# Patient Record
Sex: MALE | Race: WHITE | NOT HISPANIC OR LATINO | Employment: FULL TIME | URBAN - METROPOLITAN AREA
[De-identification: names, ages, dates, MRNs, and addresses within clinical notes are randomized per-mention and may not be internally consistent; named-entity substitution may affect disease eponyms.]

---

## 2024-09-19 ENCOUNTER — TELEPHONE (OUTPATIENT)
Dept: FAMILY MEDICINE CLINIC | Facility: CLINIC | Age: 34
End: 2024-09-19

## 2024-09-19 ENCOUNTER — NURSE TRIAGE (OUTPATIENT)
Age: 34
End: 2024-09-19

## 2024-09-19 NOTE — TELEPHONE ENCOUNTER
"Patient called in stating he is feeling of jittery today and would like to establish care. Patient diagnosed with Anxiety in the past. He states he had 2 \"seizures\" in past but, currently fells okay. He is at work- [employer].  He is not SI. He took 1 mg (alprazolam?) from some of his older medication yesterday and felt better. Did not sleep last night, having flash backs. He is a [place]. He states he has a panic disorder. His brother committed suicide 4 months ago. He has been trying to get in to see Mental health practitioner but, cannot get appointment until December. He has been to ED multiple times but, refuses to go for care today. I have a very poor connection patient. He has Medicaid insurance [id number]. Provision # [id number]. Group # [id number]. Patient will bring insurance information with him to OV 2pm. He will also attempt to get his records sent prior to OV to establish care today(other staff assisted with call). Not able to fully register due to poor phone connection and patient needing to get back to work.   Answer Assessment - Initial Assessment Questions  1. CONCERN: \"What happened that made you call today?\"      I am jittery and I want to establish care  2. DEPRESSION SYMPTOM SCREENING: \"How are you feeling overall?\" (e.g., decreased energy, increased sleeping or difficulty sleeping, difficulty concentrating, feelings of sadness, guilt, hopelessness, or worthlessness)      Jittery, frozen, anxiety, scared   3. RISK OF HARM - SUICIDAL IDEATION:  \"Do you ever have thoughts of hurting or killing yourself?\"  (e.g., yes, no, no but preoccupation with thoughts about death)    - INTENT:  \"Do you have thoughts of hurting or killing yourself right NOW?\" (e.g., yes, no, N/A)    - PLAN: \"Do you have a specific plan for how you would do this?\" (e.g., gun, knife, overdose, no plan, N/A)      no  4. RISK OF HARM - HOMICIDAL IDEATION:  \"Do you ever have thoughts of hurting or killing someone else?\"  (e.g., " "yes, no, no but preoccupation with thoughts about death)    - INTENT:  \"Do you have thoughts of hurting or killing someone right NOW?\" (e.g., yes, no, N/A)    - PLAN: \"Do you have a specific plan for how you would do this?\" (e.g., gun, knife, no plan, N/A)       no  5. FUNCTIONAL IMPAIRMENT: \"How have things been going for you overall? Have you had more difficulty than usual doing your normal daily activities?\"  (e.g., better, same, worse; self-care, school, work, interactions)      I'm at work, my mind is scattered  6. SUPPORT: \"Who is with you now?\" \"Who do you live with?\" \"Do you have family or friends who you can talk to?\"       I'm at work  7. THERAPIST: \"Do you have a counselor or therapist? Name?\"      No waiting to see someone   8. STRESSORS: \"Has there been any new stress or recent changes in your life?\"      Lost job in July. Saw his bother commit suicide   9. ALCOHOL USE OR SUBSTANCE USE (DRUG USE): \"Do you drink alcohol or use any illegal drugs?\"      no  10. OTHER: \"Do you have any other physical symptoms right now?\" (e.g., fever)        Feels jittery    Protocols used: Depression-ADULT-[place]    "

## 2024-09-19 NOTE — TELEPHONE ENCOUNTER
Per Dr. Mcghee -- at this time this office is unable to manage new controlled substance patients.    Edith will call patient and advise of same and cancel appointment scheduled for today

## 2024-09-19 NOTE — TELEPHONE ENCOUNTER
Warm transfer from [facility].     Patient calling feeling like he will feels like he is going to have a seizure. Pt states 4 month ago his brother committed suicide and it been hard for him as he found him. Pt states he has not slept , feels very jittery , and very anxious. Pt states taking Rx: Alprazolam and he is out medication and he states without medication will have seizure.Advised patient needs to be further evaluated at ED. Pt states was seen in ED prior were not able to do much for him he is looking to be evaluated and establish care with PCP.      Warm transfer to Nurse for further evaluation of symptoms.

## 2024-09-19 NOTE — TELEPHONE ENCOUNTER
"Called and spoke with patient, patient was advised that we are accepting new patients but we would not be prescribing any controlled medications. Patient reported that the reason for his visit was because Reji who has PTSD also comes to our office gets the medication that he needs and now he is completely fu*ked, I apologized to patient that he was misinformed and that Reji cannot dictate what we prescribe. Patient reports he had 2 panic attacks today and we completely screwed him. Patient stated \" well you can kiss my visit goodbye\". Confirmed with patient that we were cancelling todays visit. Patient verbally confirmed. Called ended visit cancelled.   "